# Patient Record
Sex: FEMALE | Race: WHITE | URBAN - METROPOLITAN AREA
[De-identification: names, ages, dates, MRNs, and addresses within clinical notes are randomized per-mention and may not be internally consistent; named-entity substitution may affect disease eponyms.]

---

## 2020-01-25 ENCOUNTER — APPOINTMENT (OUTPATIENT)
Dept: LAB | Age: 30
End: 2020-01-25
Attending: NURSE PRACTITIONER
Payer: COMMERCIAL

## 2020-01-25 ENCOUNTER — OFFICE VISIT (OUTPATIENT)
Dept: FAMILY MEDICINE CLINIC | Facility: CLINIC | Age: 30
End: 2020-01-25
Payer: COMMERCIAL

## 2020-01-25 VITALS
HEART RATE: 88 BPM | TEMPERATURE: 98 F | BODY MASS INDEX: 22.33 KG/M2 | OXYGEN SATURATION: 98 % | HEIGHT: 65 IN | SYSTOLIC BLOOD PRESSURE: 104 MMHG | DIASTOLIC BLOOD PRESSURE: 54 MMHG | RESPIRATION RATE: 12 BRPM | WEIGHT: 134 LBS

## 2020-01-25 DIAGNOSIS — Z00.00 WELLNESS EXAMINATION: ICD-10-CM

## 2020-01-25 DIAGNOSIS — Z13.0 SCREENING FOR DEFICIENCY ANEMIA: ICD-10-CM

## 2020-01-25 DIAGNOSIS — Z13.29 THYROID DISORDER SCREEN: ICD-10-CM

## 2020-01-25 DIAGNOSIS — Z13.220 LIPID SCREENING: ICD-10-CM

## 2020-01-25 DIAGNOSIS — Z00.00 ROUTINE GENERAL MEDICAL EXAMINATION AT A HEALTH CARE FACILITY: Primary | ICD-10-CM

## 2020-01-25 DIAGNOSIS — D56.3 THALASSEMIA MINOR: ICD-10-CM

## 2020-01-25 LAB
ALBUMIN SERPL-MCNC: 4.4 G/DL (ref 3.4–5)
ALBUMIN/GLOB SERPL: 1.5 {RATIO} (ref 1–2)
ALP LIVER SERPL-CCNC: 43 U/L (ref 37–98)
ALT SERPL-CCNC: 16 U/L (ref 13–56)
ANION GAP SERPL CALC-SCNC: 7 MMOL/L (ref 0–18)
AST SERPL-CCNC: 38 U/L (ref 15–37)
BASOPHILS # BLD AUTO: 0.06 X10(3) UL (ref 0–0.2)
BASOPHILS NFR BLD AUTO: 1.2 %
BILIRUB SERPL-MCNC: 4 MG/DL (ref 0.1–2)
BUN BLD-MCNC: 6 MG/DL (ref 7–18)
BUN/CREAT SERPL: 11.1 (ref 10–20)
CALCIUM BLD-MCNC: 8.4 MG/DL (ref 8.5–10.1)
CHLORIDE SERPL-SCNC: 106 MMOL/L (ref 98–112)
CHOLEST SMN-MCNC: 106 MG/DL (ref ?–200)
CO2 SERPL-SCNC: 27 MMOL/L (ref 21–32)
CREAT BLD-MCNC: 0.54 MG/DL (ref 0.55–1.02)
DEPRECATED RDW RBC AUTO: 62.8 FL (ref 35.1–46.3)
EOSINOPHIL # BLD AUTO: 0.08 X10(3) UL (ref 0–0.7)
EOSINOPHIL NFR BLD AUTO: 1.6 %
ERYTHROCYTE [DISTWIDTH] IN BLOOD BY AUTOMATED COUNT: 25.7 % (ref 11–15)
GLOBULIN PLAS-MCNC: 3 G/DL (ref 2.8–4.4)
GLUCOSE BLD-MCNC: 85 MG/DL (ref 70–99)
HCT VFR BLD AUTO: 27.4 % (ref 35–48)
HDLC SERPL-MCNC: 47 MG/DL (ref 40–59)
HGB BLD-MCNC: 8.5 G/DL (ref 12–16)
IMM GRANULOCYTES # BLD AUTO: 0.02 X10(3) UL (ref 0–1)
IMM GRANULOCYTES NFR BLD: 0.4 %
LDLC SERPL CALC-MCNC: 44 MG/DL (ref ?–100)
LYMPHOCYTES # BLD AUTO: 1.32 X10(3) UL (ref 1–4)
LYMPHOCYTES NFR BLD AUTO: 26.1 %
M PROTEIN MFR SERPL ELPH: 7.4 G/DL (ref 6.4–8.2)
MCH RBC QN AUTO: 22 PG (ref 26–34)
MCHC RBC AUTO-ENTMCNC: 31 G/DL (ref 31–37)
MCV RBC AUTO: 70.8 FL (ref 80–100)
MONOCYTES # BLD AUTO: 0.37 X10(3) UL (ref 0.1–1)
MONOCYTES NFR BLD AUTO: 7.3 %
NEUTROPHILS # BLD AUTO: 3.2 X10 (3) UL (ref 1.5–7.7)
NEUTROPHILS # BLD AUTO: 3.2 X10(3) UL (ref 1.5–7.7)
NEUTROPHILS NFR BLD AUTO: 63.4 %
NONHDLC SERPL-MCNC: 59 MG/DL (ref ?–130)
OSMOLALITY SERPL CALC.SUM OF ELEC: 287 MOSM/KG (ref 275–295)
PATIENT FASTING Y/N/NP: YES
PATIENT FASTING Y/N/NP: YES
PLATELET # BLD AUTO: 275 10(3)UL (ref 150–450)
POTASSIUM SERPL-SCNC: 4.4 MMOL/L (ref 3.5–5.1)
RBC # BLD AUTO: 3.87 X10(6)UL (ref 3.8–5.3)
SODIUM SERPL-SCNC: 140 MMOL/L (ref 136–145)
TRIGL SERPL-MCNC: 74 MG/DL (ref 30–149)
TSI SER-ACNC: 0.77 MIU/ML (ref 0.36–3.74)
VLDLC SERPL CALC-MCNC: 15 MG/DL (ref 0–30)
WBC # BLD AUTO: 5.1 X10(3) UL (ref 4–11)

## 2020-01-25 PROCEDURE — 36415 COLL VENOUS BLD VENIPUNCTURE: CPT | Performed by: NURSE PRACTITIONER

## 2020-01-25 PROCEDURE — 90471 IMMUNIZATION ADMIN: CPT | Performed by: NURSE PRACTITIONER

## 2020-01-25 PROCEDURE — 90686 IIV4 VACC NO PRSV 0.5 ML IM: CPT | Performed by: NURSE PRACTITIONER

## 2020-01-25 PROCEDURE — 80050 GENERAL HEALTH PANEL: CPT | Performed by: NURSE PRACTITIONER

## 2020-01-25 PROCEDURE — 99385 PREV VISIT NEW AGE 18-39: CPT | Performed by: NURSE PRACTITIONER

## 2020-01-25 PROCEDURE — 80061 LIPID PANEL: CPT | Performed by: NURSE PRACTITIONER

## 2020-01-25 PROCEDURE — 86787 VARICELLA-ZOSTER ANTIBODY: CPT | Performed by: NURSE PRACTITIONER

## 2020-01-25 NOTE — PATIENT INSTRUCTIONS
Check into Hep B vaccination - recommend to start soon. Titer for chicken pox today. Other labs pending. Form completed for her phlebotomy class. Request copy of last pap to be sent to our office. Recheck annually and as needed.

## 2020-01-25 NOTE — PROGRESS NOTES
HPI:     Pia Russell is a 26year old female who presents for an Annual Health Visit. Present to establish care  Robert Larson for this. Has had 8 transfusions with each pregnancy.    Had a pap about 2 years ago - normal. Sees gy cooperative  Head: Normocephalic, without obvious abnormality, atraumatic  Eyes: negative findings: wears glasses, vision 20/20 to each eye, 20/15 both eyes  Ears: normal TM's and external ear canals both ears  Nose: Nares normal. Septum midline.  Mucosa no Request copy of last pap to be sent to our office. Recheck annually and as needed. The patient indicates understanding of these issues and agrees to the plan.     Problem List:  Patient Active Problem List:     Thalassemia minor     Routine

## 2020-01-27 LAB — VZV IGG SER IA-ACNC: 730.7 (ref 165–?)

## 2020-02-10 ENCOUNTER — TELEPHONE (OUTPATIENT)
Dept: FAMILY MEDICINE CLINIC | Facility: CLINIC | Age: 30
End: 2020-02-10

## 2020-02-10 DIAGNOSIS — Z01.84 IMMUNITY STATUS TESTING: Primary | ICD-10-CM

## 2020-02-10 NOTE — TELEPHONE ENCOUNTER
Left detailed message on pt's voicemail that order has been entered and she just needs to call back and schedule a lab appt.

## 2020-02-10 NOTE — TELEPHONE ENCOUNTER
Pt seen for physical on 1/25/20 and states that she cannot find the documentation for Hep B. She is requesting an order for the titers. Please advise.

## 2020-02-10 NOTE — TELEPHONE ENCOUNTER
saw jennifer in january and was going to wait to see if she could find copy of her imms -  she is not able to find them and wants to know if she can have an order for titers done

## 2020-02-12 ENCOUNTER — APPOINTMENT (OUTPATIENT)
Dept: LAB | Age: 30
End: 2020-02-12
Attending: FAMILY MEDICINE
Payer: COMMERCIAL

## 2020-02-12 DIAGNOSIS — Z01.84 IMMUNITY STATUS TESTING: ICD-10-CM

## 2020-02-12 LAB
HBV SURFACE AB SER QL: REACTIVE
HBV SURFACE AB SERPL IA-ACNC: >1000 MIU/ML

## 2020-02-12 PROCEDURE — 86706 HEP B SURFACE ANTIBODY: CPT | Performed by: NURSE PRACTITIONER

## 2020-02-12 PROCEDURE — 36415 COLL VENOUS BLD VENIPUNCTURE: CPT | Performed by: NURSE PRACTITIONER

## 2020-02-13 ENCOUNTER — TELEPHONE (OUTPATIENT)
Dept: FAMILY MEDICINE CLINIC | Facility: CLINIC | Age: 30
End: 2020-02-13

## 2020-09-10 ENCOUNTER — TELEPHONE (OUTPATIENT)
Dept: FAMILY MEDICINE CLINIC | Facility: CLINIC | Age: 30
End: 2020-09-10

## 2020-09-10 NOTE — TELEPHONE ENCOUNTER
LOV  1/25/20 for physical. Patient is asking for an order for 2 step tb test
OK for 2 step TB (orders entered) and flu vaccine when we have it.    OK to mail the results
Patient has only seen Mary Ann Love. Will route to her pool.
Spoke with patient.  Scheduled for nurse visit and explained the process
needs an order for a 2 step tb test also needs hep B titer mailed to her.  also wants to get a flu inj
Normal rate, regular rhythm.  Heart sounds S1, S2.  No murmurs, rubs or gallops.

## 2020-09-16 ENCOUNTER — NURSE ONLY (OUTPATIENT)
Dept: FAMILY MEDICINE CLINIC | Facility: CLINIC | Age: 30
End: 2020-09-16
Payer: COMMERCIAL

## 2020-09-16 ENCOUNTER — IMMUNIZATION (OUTPATIENT)
Dept: FAMILY MEDICINE CLINIC | Facility: CLINIC | Age: 30
End: 2020-09-16
Payer: COMMERCIAL

## 2020-09-16 VITALS — TEMPERATURE: 99 F

## 2020-09-16 DIAGNOSIS — Z23 NEED FOR VACCINATION: ICD-10-CM

## 2020-09-16 DIAGNOSIS — Z00.00 ROUTINE GENERAL MEDICAL EXAMINATION AT A HEALTH CARE FACILITY: ICD-10-CM

## 2020-09-16 PROCEDURE — 90686 IIV4 VACC NO PRSV 0.5 ML IM: CPT | Performed by: NURSE PRACTITIONER

## 2020-09-16 PROCEDURE — 90471 IMMUNIZATION ADMIN: CPT | Performed by: NURSE PRACTITIONER

## 2020-09-16 PROCEDURE — 86580 TB INTRADERMAL TEST: CPT | Performed by: NURSE PRACTITIONER

## 2020-09-16 NOTE — PROGRESS NOTES
See phone note dated 9/10/20. PPD #1 given right forearm-  Pt tolerated well. Pt has also received flu vaccine- left deltoid. - tolerated well. Pt will call back to schedule PPD reading on Saturday morning.

## 2020-09-19 ENCOUNTER — NURSE ONLY (OUTPATIENT)
Dept: FAMILY MEDICINE CLINIC | Facility: CLINIC | Age: 30
End: 2020-09-19
Payer: COMMERCIAL

## 2020-09-23 ENCOUNTER — NURSE ONLY (OUTPATIENT)
Dept: FAMILY MEDICINE CLINIC | Facility: CLINIC | Age: 30
End: 2020-09-23
Payer: COMMERCIAL

## 2020-09-23 PROCEDURE — 86580 TB INTRADERMAL TEST: CPT | Performed by: NURSE PRACTITIONER

## 2020-09-26 ENCOUNTER — NURSE ONLY (OUTPATIENT)
Dept: FAMILY MEDICINE CLINIC | Facility: CLINIC | Age: 30
End: 2020-09-26
Payer: COMMERCIAL

## 2020-12-04 ENCOUNTER — TELEPHONE (OUTPATIENT)
Dept: FAMILY MEDICINE CLINIC | Facility: CLINIC | Age: 30
End: 2020-12-04

## 2020-12-04 NOTE — TELEPHONE ENCOUNTER
Pt called back. Pt c/o anxiety and feeling nervous. Pt reports s/s for a couple years now. Pt states she has social anxiety and states she is OCD that also stresses her out. Pt states she sometimes feels so anxious that her chest \"gets tight\".   Pt de

## 2020-12-08 NOTE — PATIENT INSTRUCTIONS
Consider counseling with CBT (cognitive behavior therapy). Juan Luis Salinas available at our office. Start Lexapro daily    Take Xanax as needed for anxiety    Recheck in 3 weeks - sooner if needed.        Understanding Social Phobia (Social Anxiety Disorde mouth  Getting help  Asking for help may be very hard for you, but the effort will be worth it. Certain medicines may lessen your symptoms. And behavioral therapy may help you conquer your fears.  This involves working with your therapist to learn how to re pharmacist with each prescription and refill. Be sure to read this information carefully each time. Talk to your pediatrician regarding the use of this medicine in children. Special care may be needed.   What side effects may I notice from receiving this m medicines for depression, anxiety, or psychotic disturbances  · certain medicines for migraine headache like almotriptan, eletriptan, frovatriptan, naratriptan, rizatriptan, sumatriptan, zolmitriptan  · certain medicines for sleep  · certain medicines that your treatment as prescribed by your doctor. Patients and their families should watch out for new or worsening thoughts of suicide or depression.  Also watch out for sudden changes in feelings such as feeling anxious, agitated, panicky, irritable, hostile,

## 2020-12-08 NOTE — PROGRESS NOTES
HPI:    Patient ID: Jozef Cameron is a 31year old female. HPI     States that her anxiety is getting really bad. More social and trouble wanting to be around people. Notes that her heart will race.  States that she is a little OCD and always cleani Pulmonary/Chest: Effort normal and breath sounds normal. No respiratory distress. Musculoskeletal: Normal range of motion. Lymphadenopathy:     She has no cervical adenopathy. Neurological: She is alert and oriented to person, place, and time.    Skin Social phobia is also called social anxiety disorder. It is an intense fear of being humiliated in a social or work setting. Even if you realize that your worries are irrational, you still expect people to  and think poorly of you.  You may stay away f Asking for help may be very hard for you, but the effort will be worth it. Certain medicines may lessen your symptoms. And behavioral therapy may help you conquer your fears.  This involves working with your therapist to learn how to relax when you feel anx Israel Cintron will be given to you by the pharmacist with each prescription and refill. Be sure to read this information carefully each time. Talk to your pediatrician regarding the use of this medicine in children. Special care may be needed.   What · aspirin and aspirin-like medicines  · carbamazepine  · certain medicines for depression, anxiety, or psychotic disturbances  · certain medicines for migraine headache like almotriptan, eletriptan, frovatriptan, naratriptan, rizatriptan, sumatriptan, zolm Tell your doctor if your symptoms do not get better or if they get worse. Visit your doctor or health care professional for regular checks on your progress.  Because it may take several weeks to see the full effects of this medicine, it is important to cont

## 2020-12-17 ENCOUNTER — TELEPHONE (OUTPATIENT)
Dept: FAMILY MEDICINE CLINIC | Facility: CLINIC | Age: 30
End: 2020-12-17

## 2020-12-17 NOTE — TELEPHONE ENCOUNTER
This doesn't sound like a typical reaction. Recommend to try taking again to today. If symptoms recur, stop the medication and let us know.

## 2020-12-17 NOTE — TELEPHONE ENCOUNTER
Patient started taking escitalopram 12/8/20  Daily. She has not had any reaction until yesterday 12/16/20. Unsure if it was this medication that caused the symptoms. Itchy scalp, arms and legs with cold sweat last night. Resolved this morning.  No rash or S

## 2020-12-17 NOTE — TELEPHONE ENCOUNTER
Pt thinks she is having a reaction to the Lexapro that was prescribed to her. She has itchy scalp and arms with chills at night. Pt has not taken the med today, please c/b.

## 2020-12-18 ENCOUNTER — TELEPHONE (OUTPATIENT)
Dept: FAMILY MEDICINE CLINIC | Facility: CLINIC | Age: 30
End: 2020-12-18

## 2020-12-18 NOTE — TELEPHONE ENCOUNTER
Pt was seen in office per CS - 12/8/20  Pt was given order to start Lexapro. See notes from yesterday, pt   States she took another dose of Lexapro yesterday- but states she has scalp and facial itching and hives on arms, legs and face.     Pt states her

## 2020-12-18 NOTE — TELEPHONE ENCOUNTER
Pt established care with Sherin back on 1/25/20. Pt seen again 12/8/20. Note routed to [facility] as pt was not seen by CR. Pt agreed to await call back on Monday after Sherin's review.

## 2020-12-18 NOTE — TELEPHONE ENCOUNTER
----- Message from Kim Joey sent at 8/30/2017  2:31 PM CDT -----  Contact: pt's sister ilana [phone]  pt's sister ilana [phone] requests the nurse to call her to reschedule pt's appointment in late October.   Spoke with pt's daughter at 255pm on 08/30/17, appointment was rescheduled per request to 10/13/17, labs due 0800am and clinic visit at 09am, daughter verbalized understanding.  Niya   Re: update from Medication -   Still having same issues after taking it yeserday -  Any recommendations - Please advise

## 2020-12-21 NOTE — TELEPHONE ENCOUNTER
Agree to stop Lexapro. Please see if patient wants to try Effexor or another medication for her anxiety. Thank you.

## 2020-12-22 ENCOUNTER — LAB ENCOUNTER (OUTPATIENT)
Dept: LAB | Age: 30
End: 2020-12-22
Attending: NURSE PRACTITIONER
Payer: COMMERCIAL

## 2020-12-22 PROBLEM — D50.9 IRON DEFICIENCY ANEMIA: Status: ACTIVE | Noted: 2017-05-24

## 2020-12-22 PROCEDURE — 83550 IRON BINDING TEST: CPT | Performed by: NURSE PRACTITIONER

## 2020-12-22 PROCEDURE — 82607 VITAMIN B-12: CPT | Performed by: NURSE PRACTITIONER

## 2020-12-22 PROCEDURE — 80050 GENERAL HEALTH PANEL: CPT | Performed by: NURSE PRACTITIONER

## 2020-12-22 PROCEDURE — 83540 ASSAY OF IRON: CPT | Performed by: NURSE PRACTITIONER

## 2020-12-22 PROCEDURE — 36415 COLL VENOUS BLD VENIPUNCTURE: CPT | Performed by: NURSE PRACTITIONER

## 2020-12-22 PROCEDURE — 82306 VITAMIN D 25 HYDROXY: CPT | Performed by: NURSE PRACTITIONER

## 2020-12-22 PROCEDURE — 82728 ASSAY OF FERRITIN: CPT | Performed by: NURSE PRACTITIONER

## 2020-12-22 NOTE — PROGRESS NOTES
HPI:    Patient ID: Giuliana Boyd is a 31year old female. HPI     Patient is present with concern about having hives when she is with taking the Lexapro. States it started in her second week of taking the medicine.   It has not reoccurred since she Neck: Normal range of motion. Neck supple. Thyromegaly (Thyroid was slightly enlarged) present. Cardiovascular: Normal rate, regular rhythm, normal heart sounds and intact distal pulses.    Pulmonary/Chest: Effort normal and breath sounds normal. No res

## 2020-12-22 NOTE — PATIENT INSTRUCTIONS
No more Lexapro. Start Pristiq daily. Recheck in about a month.      Labs pending with a copy to go to Dr. Elle Yang

## 2020-12-23 ENCOUNTER — TELEPHONE (OUTPATIENT)
Dept: FAMILY MEDICINE CLINIC | Facility: CLINIC | Age: 30
End: 2020-12-23

## 2020-12-23 DIAGNOSIS — E04.9 ENLARGED THYROID: Primary | ICD-10-CM

## 2020-12-23 NOTE — TELEPHONE ENCOUNTER
----- Message from NetScalerBREANA sent at 12/23/2020 11:36 AM CST -----  TSH is normal. Note to Christophert.

## 2020-12-23 NOTE — TELEPHONE ENCOUNTER
Patient asked if you wanted labs to check her thyroid. She thought it was part of the labs being drawn.

## 2020-12-23 NOTE — TELEPHONE ENCOUNTER
----- Message from Sherren Portela, APRN sent at 12/23/2020  8:17 AM CST -----  Please fax labs to Dr. Jovita Vaughn. Patient to contact him for an appointment. Vitamin levels and iron are low to low normal. Recommend a multivitamin with iron daily.

## 2020-12-23 NOTE — TELEPHONE ENCOUNTER
Re:  talk to Dr. Joey Chisholm -  medication might be affecting her blood -   wants to discuss her Anexity med - going to stop the medication - any other medication that can help with her anexity

## 2020-12-23 NOTE — TELEPHONE ENCOUNTER
Patient states Dr. Hannah Cavazos informed her the Lexapro might have hemolyzed her blood. She took a dose of Pristiq and had the same reaction when she took the lexapro. Itchy scalp. Did not develop hives. Did not take another dose of Pristiq.

## 2020-12-23 NOTE — TELEPHONE ENCOUNTER
Hold the Pristiq. Recommend seeing a counselor for CBT (cognitive behavior therapy). Also recommend follow up with  Dr. Kristopher Riddle.

## 2021-01-08 ENCOUNTER — PATIENT MESSAGE (OUTPATIENT)
Dept: FAMILY MEDICINE CLINIC | Facility: CLINIC | Age: 31
End: 2021-01-08

## 2021-01-08 NOTE — TELEPHONE ENCOUNTER
From: Abiola Jaquez  To: BREANA Dejesus  Sent: 1/8/2021 9:33 AM CST  Subject: Prescription Question    Hi there.  I was just wanting to update you on my visit with Dr. Dat Norris and that he said I could try taking the pristiq since it is not exactl

## 2021-01-30 ENCOUNTER — PATIENT MESSAGE (OUTPATIENT)
Dept: FAMILY MEDICINE CLINIC | Facility: CLINIC | Age: 31
End: 2021-01-30

## 2021-01-30 RX ORDER — ALPRAZOLAM 0.25 MG/1
0.25 TABLET ORAL 3 TIMES DAILY PRN
Qty: 20 TABLET | Refills: 0 | Status: SHIPPED | OUTPATIENT
Start: 2021-01-30 | End: 2021-06-02

## 2021-01-30 RX ORDER — DESVENLAFAXINE 50 MG/1
50 TABLET, EXTENDED RELEASE ORAL DAILY
Qty: 30 TABLET | Refills: 1 | Status: SHIPPED | OUTPATIENT
Start: 2021-01-30 | End: 2021-03-09

## 2021-01-30 NOTE — TELEPHONE ENCOUNTER
From: Juani Van  To: BREANA Macedo  Sent: 1/30/2021 9:09 AM CST  Subject: Prescription Question    Hi there I was just wanting to update you about the pristiq medicine, I have not had any reaction to it and my levels were about normal. It do

## 2021-01-30 NOTE — TELEPHONE ENCOUNTER
From: Marcia Gonsalez  To: BREANA Awad  Sent: 1/30/2021 10:05 AM CST  Subject: Other    [id number] Debby Wei that works, I may consider upping the pristiq. I will let you know if I may need to. Thank you.

## 2021-03-08 RX ORDER — DESVENLAFAXINE 50 MG/1
50 TABLET, EXTENDED RELEASE ORAL DAILY
Qty: 30 TABLET | Refills: 1 | Status: CANCELLED | OUTPATIENT
Start: 2021-03-08

## 2021-03-08 NOTE — TELEPHONE ENCOUNTER
Future appt: Due for follow up .  Sent Natanael Ulient message    Last Appointment with provider:   12/22/2020 Medication/anxiety follow up  Last appointment at [facility]:  12/22/2020  Cholesterol, Total (mg/dL)   Date Value   01/25/2020 106     HDL Cholesterol

## 2021-03-09 ENCOUNTER — TELEPHONE (OUTPATIENT)
Dept: FAMILY MEDICINE CLINIC | Facility: CLINIC | Age: 31
End: 2021-03-09

## 2021-03-09 PROBLEM — F40.10 SOCIAL ANXIETY DISORDER: Status: ACTIVE | Noted: 2021-03-09

## 2021-03-09 NOTE — TELEPHONE ENCOUNTER
Akash Roper  verbally consents to a Virtual/Telephone Check-In service on 3/9/2021. Patient understands and accepts financial responsibility for any deductible, co-insurance and/or co-pays associated with this service.   Future Appointments   Date T

## 2021-03-09 NOTE — PROGRESS NOTES
HPI:    Patient ID: Rufino Gallegos is a 31year old female. HPI     Video visit done with patient to follow-up on her Pristiq. States that she was doing fairly well on the medicine until she ran out on Saturday.   Since then she has been noting seeing distress. Neurological:      Mental Status: She is alert. Psychiatric:         Attention and Perception: Attention and perception normal.         Mood and Affect: Mood normal.         Behavior: Behavior normal.         Thought Content:  Thought content

## 2021-04-05 NOTE — TELEPHONE ENCOUNTER
From: Koko Mitchell  To: BREANA Clay  Sent: 4/5/2021 1:35 PM CDT  Subject: Prescription Question    Hi there, I am down to 2 of the pristiq. Should I schedule an appointment for a refill?  The zoloft has been helping a little with my OCD and I

## 2021-04-07 ENCOUNTER — TELEPHONE (OUTPATIENT)
Dept: FAMILY MEDICINE CLINIC | Facility: CLINIC | Age: 31
End: 2021-04-07

## 2021-04-07 NOTE — TELEPHONE ENCOUNTER
Linette Velez  verbally consents to a Virtual/Telephone Check-In service on 4/9/2021. Patient understands and accepts financial responsibility for any deductible, co-insurance and/or co-pays associated with this service.         consent given for VV

## 2021-04-09 ENCOUNTER — TELEMEDICINE (OUTPATIENT)
Dept: FAMILY MEDICINE CLINIC | Facility: CLINIC | Age: 31
End: 2021-04-09
Payer: COMMERCIAL

## 2021-04-09 DIAGNOSIS — F42.9 OBSESSIVE-COMPULSIVE DISORDER, UNSPECIFIED TYPE: ICD-10-CM

## 2021-04-09 PROCEDURE — 99213 OFFICE O/P EST LOW 20 MIN: CPT | Performed by: NURSE PRACTITIONER

## 2021-04-09 RX ORDER — DESVENLAFAXINE 50 MG/1
50 TABLET, EXTENDED RELEASE ORAL DAILY
Qty: 30 TABLET | Refills: 2 | Status: SHIPPED | OUTPATIENT
Start: 2021-04-09 | End: 2021-05-05

## 2021-04-12 NOTE — PATIENT INSTRUCTIONS
Change from Zoloft to [address]. Call with update in a few weeks. Otherwise follow up as needed.

## 2021-04-12 NOTE — PROGRESS NOTES
HPI:    Patient ID: Linette Velez is a 31year old female. HPI     Video visit was done with patient to follow-up on her Zoloft. Patient feels like it is helping some but is not completely to where she wants it.   Feels like she still is having some Behavior: Behavior normal.         Thought Content:  Thought content normal.         Judgment: Judgment normal.                ASSESSMENT/PLAN:   Obsessive-compulsive disorder, unspecified type    No orders of the defined types were placed in this encounte

## 2021-04-16 NOTE — TELEPHONE ENCOUNTER
Future appt:    Last Appointment with provider:   telemed 4/9/21(OCD)  Last appointment at [facility]:  12/22/2020    Sertraline HCl 25 MG Oral Tab    30tab  1refill        Filled:3/9/21 Summary: Take 1 tablet (25 mg total) by mouth daily            Chol

## 2021-05-05 DIAGNOSIS — F42.9 OBSESSIVE-COMPULSIVE DISORDER, UNSPECIFIED TYPE: ICD-10-CM

## 2021-05-06 RX ORDER — DESVENLAFAXINE 50 MG/1
50 TABLET, EXTENDED RELEASE ORAL DAILY
Qty: 30 TABLET | Refills: 0 | Status: SHIPPED | OUTPATIENT
Start: 2021-05-06 | End: 2021-06-08

## 2021-05-06 NOTE — TELEPHONE ENCOUNTER
Future appt:    Last Appointment with provider:   4/9/21telemed(OCD)-F/U in couple of weeks  Last appointment at [facility]:  12/22/2020    Desvenlafaxine Succinate ER (PRISTIQ) 50 MG Oral Tablet 24 Hr    30tab  2refill      Filled:4/9/21 Summary: Take 1

## 2021-05-20 DIAGNOSIS — F42.9 OBSESSIVE-COMPULSIVE DISORDER, UNSPECIFIED TYPE: ICD-10-CM

## 2021-05-21 RX ORDER — SERTRALINE HYDROCHLORIDE 25 MG/1
25 TABLET, FILM COATED ORAL DAILY
Qty: 30 TABLET | Refills: 1 | Status: SHIPPED | OUTPATIENT
Start: 2021-05-21 | End: 2021-06-08

## 2021-05-21 NOTE — TELEPHONE ENCOUNTER
Future appt:    Last Appointment with provider:   4/9/21 for OCD  Last appointment at [facility] [place]:  12/22/2020  Cholesterol, Total (mg/dL)   Date Value   01/25/2020 106     HDL Cholesterol (mg/dL)   Date Value   01/25/2020 47     LDL Cholesterol (mg/dL)

## 2021-06-02 NOTE — TELEPHONE ENCOUNTER
Alprazolam: 1/30/21    Call with update in a few weeks.      Otherwise follow up as needed.             Future appt:    Last Appointment with provider:     Telemedicine:  4/9/21    Last appointment at [facility]:  12/22/2020  Cholesterol, Total (mg/dL)

## 2021-06-03 RX ORDER — ALPRAZOLAM 0.25 MG/1
0.25 TABLET ORAL 3 TIMES DAILY PRN
Qty: 20 TABLET | Refills: 0 | Status: SHIPPED | OUTPATIENT
Start: 2021-06-03

## 2021-06-03 NOTE — TELEPHONE ENCOUNTER
IL  reviewed. One refill done. Please let patient know and that she is due for a follow up before more refills. Thank you.

## 2021-06-04 DIAGNOSIS — F42.9 OBSESSIVE-COMPULSIVE DISORDER, UNSPECIFIED TYPE: ICD-10-CM

## 2021-06-04 RX ORDER — DESVENLAFAXINE 50 MG/1
50 TABLET, EXTENDED RELEASE ORAL DAILY
Qty: 30 TABLET | Refills: 0 | Status: CANCELLED | OUTPATIENT
Start: 2021-06-04

## 2021-06-04 NOTE — TELEPHONE ENCOUNTER
Future appt:    Last Appointment with provider:  4/9/21 for medication follow up.   Last appointment [place] [facility] [place]:  12/22/2020  Cholesterol, Total (mg/dL)   Date Value   01/25/2020 106     HDL Cholesterol (mg/dL)   Date Value   01/25/2020 47     LDL Chol

## 2021-06-08 NOTE — PROGRESS NOTES
HPI:    Patient ID: Matilde Brink is a 31year old female. HPI      Video visit done with patient for follow up on her medications.  States that in general she is better, but still struggles toward the end of the day with increased stress and feeling Psychiatric:         Attention and Perception: Attention and perception normal.         Mood and Affect: Mood and affect normal.         Speech: Speech normal.         Behavior: Behavior normal. Behavior is cooperative. Thought Content:  Thought c

## 2021-06-08 NOTE — PATIENT INSTRUCTIONS
Continue with the Pristiq. Increase the sertraline to 50 mg daily. Recheck in 6 months with possible physical.  Contact office if any concerns in the meantime    Schedule a GYN appointment in the meantime.

## 2021-06-08 NOTE — TELEPHONE ENCOUNTER
Pt had telemed visit today 6/8/21 and jennifer refilled this med under another encounter.  This is now a dup med and is being deleted

## 2021-07-07 DIAGNOSIS — F42.9 OBSESSIVE-COMPULSIVE DISORDER, UNSPECIFIED TYPE: ICD-10-CM

## 2021-07-07 RX ORDER — DESVENLAFAXINE 50 MG/1
50 TABLET, EXTENDED RELEASE ORAL DAILY
Qty: 30 TABLET | Refills: 5 | OUTPATIENT
Start: 2021-07-07

## 2021-08-08 DIAGNOSIS — F42.9 OBSESSIVE-COMPULSIVE DISORDER, UNSPECIFIED TYPE: ICD-10-CM

## 2021-08-09 DIAGNOSIS — F42.9 OBSESSIVE-COMPULSIVE DISORDER, UNSPECIFIED TYPE: ICD-10-CM

## 2021-08-09 RX ORDER — DESVENLAFAXINE 50 MG/1
50 TABLET, EXTENDED RELEASE ORAL DAILY
Qty: 30 TABLET | Refills: 5 | OUTPATIENT
Start: 2021-08-09

## 2021-08-09 NOTE — TELEPHONE ENCOUNTER
Future appt:    Last Appointment with provider 6/8/21- recheck in 6 months.   Last appointment at [facility] [place] [place]:  Visit date not found  Cholesterol, Total (mg/dL)   Date Value   01/25/2020 106     HDL Cholesterol (mg/dL)   Date Value   01/25/2020 47     LDL

## 2021-08-10 RX ORDER — DESVENLAFAXINE 50 MG/1
50 TABLET, EXTENDED RELEASE ORAL DAILY
Qty: 30 TABLET | Refills: 5 | OUTPATIENT
Start: 2021-08-10

## 2021-10-08 ENCOUNTER — OFFICE VISIT (OUTPATIENT)
Dept: FAMILY MEDICINE CLINIC | Facility: CLINIC | Age: 31
End: 2021-10-08
Payer: COMMERCIAL

## 2021-10-08 VITALS
WEIGHT: 137.38 LBS | HEART RATE: 88 BPM | RESPIRATION RATE: 16 BRPM | SYSTOLIC BLOOD PRESSURE: 114 MMHG | OXYGEN SATURATION: 99 % | BODY MASS INDEX: 22.89 KG/M2 | DIASTOLIC BLOOD PRESSURE: 68 MMHG | HEIGHT: 65 IN | TEMPERATURE: 98 F

## 2021-10-08 DIAGNOSIS — B07.8 OTHER VIRAL WARTS: ICD-10-CM

## 2021-10-08 DIAGNOSIS — D56.3 THALASSEMIA MINOR: ICD-10-CM

## 2021-10-08 DIAGNOSIS — F42.9 OBSESSIVE-COMPULSIVE DISORDER, UNSPECIFIED TYPE: ICD-10-CM

## 2021-10-08 DIAGNOSIS — D50.9 IRON DEFICIENCY ANEMIA, UNSPECIFIED IRON DEFICIENCY ANEMIA TYPE: ICD-10-CM

## 2021-10-08 DIAGNOSIS — D22.9 CHANGE IN COLOR OF SKIN MOLE: Primary | ICD-10-CM

## 2021-10-08 PROCEDURE — 99214 OFFICE O/P EST MOD 30 MIN: CPT | Performed by: NURSE PRACTITIONER

## 2021-10-08 PROCEDURE — 85025 COMPLETE CBC W/AUTO DIFF WBC: CPT | Performed by: NURSE PRACTITIONER

## 2021-10-08 PROCEDURE — 83540 ASSAY OF IRON: CPT | Performed by: NURSE PRACTITIONER

## 2021-10-08 PROCEDURE — 82728 ASSAY OF FERRITIN: CPT | Performed by: NURSE PRACTITIONER

## 2021-10-08 PROCEDURE — 3074F SYST BP LT 130 MM HG: CPT | Performed by: NURSE PRACTITIONER

## 2021-10-08 PROCEDURE — 83550 IRON BINDING TEST: CPT | Performed by: NURSE PRACTITIONER

## 2021-10-08 PROCEDURE — 3008F BODY MASS INDEX DOCD: CPT | Performed by: NURSE PRACTITIONER

## 2021-10-08 PROCEDURE — 3078F DIAST BP <80 MM HG: CPT | Performed by: NURSE PRACTITIONER

## 2021-10-08 RX ORDER — DESVENLAFAXINE 50 MG/1
50 TABLET, EXTENDED RELEASE ORAL DAILY
Qty: 90 TABLET | Refills: 1 | Status: SHIPPED | OUTPATIENT
Start: 2021-10-08

## 2021-10-08 NOTE — PATIENT INSTRUCTIONS
Referral to derm. Keep thumb covered today. Repeat cryo in 3 weeks if not resolved. Labs pending. Follow up as needed.

## 2021-10-09 ENCOUNTER — TELEPHONE (OUTPATIENT)
Dept: FAMILY MEDICINE CLINIC | Facility: CLINIC | Age: 31
End: 2021-10-09

## 2021-10-09 NOTE — TELEPHONE ENCOUNTER
----- Message from BREANA Rios sent at 10/9/2021  9:18 AM CDT -----  Iron is improved. Still shows anemia. Recommend referral to hematology. Please see if she has seen one in the past. Thank you. Note to Annalisa.

## 2021-10-13 NOTE — TELEPHONE ENCOUNTER
Informed pt of results. Pt sees Dr. Chen Johnson and requested lab work be sent there. Pt will follow with them. No further questions.

## 2021-10-15 NOTE — PROGRESS NOTES
HPI:    Patient ID: Adrienne Candelaria is a 27year old female. HPI     Patient is present with 2 concerns. First she has a mole on her left upper arm that was inflamed a few days ago.    Second she has a wart on her thumb that she hasn't been able to reso Change in color of skin mole  (primary encounter diagnosis)  Other viral warts  Obsessive-compulsive disorder, unspecified type  Thalassemia minor  Iron deficiency anemia, unspecified iron deficiency anemia type    Orders Placed This Encounter      CBC W

## 2021-10-29 NOTE — PROGRESS NOTES
Bed: RT2  Expected date:   Expected time:   Means of arrival:   Comments:  triage   Pt arrived for second PPD. PPD #2 given to left forearm  Pt tolerated well.   Scheduled appt for reading Sat 9/26

## 2021-11-03 ENCOUNTER — TELEPHONE (OUTPATIENT)
Dept: FAMILY MEDICINE CLINIC | Facility: CLINIC | Age: 31
End: 2021-11-03

## 2021-11-03 ENCOUNTER — OFFICE VISIT (OUTPATIENT)
Dept: FAMILY MEDICINE CLINIC | Facility: CLINIC | Age: 31
End: 2021-11-03
Payer: COMMERCIAL

## 2021-11-03 VITALS
HEART RATE: 86 BPM | OXYGEN SATURATION: 99 % | SYSTOLIC BLOOD PRESSURE: 126 MMHG | TEMPERATURE: 98 F | DIASTOLIC BLOOD PRESSURE: 60 MMHG

## 2021-11-03 DIAGNOSIS — J06.9 UPPER RESPIRATORY TRACT INFECTION, UNSPECIFIED TYPE: Primary | ICD-10-CM

## 2021-11-03 PROCEDURE — 3078F DIAST BP <80 MM HG: CPT | Performed by: NURSE PRACTITIONER

## 2021-11-03 PROCEDURE — 3074F SYST BP LT 130 MM HG: CPT | Performed by: NURSE PRACTITIONER

## 2021-11-03 PROCEDURE — 99213 OFFICE O/P EST LOW 20 MIN: CPT | Performed by: NURSE PRACTITIONER

## 2021-11-03 NOTE — PROGRESS NOTES
HPI:    Patient ID: Pako Alfonso is a 27year old female. HPI     [facility]    Son positive last week. Started yesterday with aches and congestion. Chest tightness. No medications.      States that she needs the test to be able to go back to Covid test pending. Quarantine until get results.     Take acetaminophen or ibuprofen for fever/discomfort  Drink plenty of fluids, warm liquids  Decongestants for congestion  Expectorant and/or cough suppressant  Use saline drops as needed or use neti p

## 2021-11-03 NOTE — TELEPHONE ENCOUNTER
Re:  son tested postive last week of COVID. Needs a COVID test to go back to work. does have recent symptoms - headache & chst congestion .    would like to get tested @ [facility].  - Please advise

## 2021-11-03 NOTE — PATIENT INSTRUCTIONS
Covid test pending. Quarantine until get results.     Take acetaminophen or ibuprofen for fever/discomfort  Drink plenty of fluids, warm liquids  Decongestants for congestion  Expectorant and/or cough suppressant  Use saline drops as needed or use neti pot

## 2021-11-03 NOTE — TELEPHONE ENCOUNTER
Appt scheduled.      Future Appointments   Date Time Provider [facility]   11/3/2021  2:30 PM Miquel Villar APRN [facility]

## 2021-11-05 ENCOUNTER — TELEPHONE (OUTPATIENT)
Dept: FAMILY MEDICINE CLINIC | Facility: CLINIC | Age: 31
End: 2021-11-05

## 2021-11-05 NOTE — TELEPHONE ENCOUNTER
----- Message from BREANA Connor sent at 11/4/2021  6:45 PM CDT -----  Please let patient know that her Covid test is positive. She needs to quarantine for 10 days from the onset of the symptoms.  Recommend to increase fluid intake, take zinc 30 mg,

## 2021-12-30 NOTE — TELEPHONE ENCOUNTER
Future appt:    Last Appointment with provider:   11/3/2021 for URI. 10/8/21 for medication follow up.   Last appointment at [facility] [place]:  11/3/2021  Cholesterol, Total (mg/dL)   Date Value   01/25/2020 106     HDL Cholesterol (mg/dL)   Date Value   01/25/

## 2021-12-30 NOTE — TELEPHONE ENCOUNTER
One refill done.    Please let patient know that she is overdue for wellness exam and recheck on medications

## 2022-06-04 DIAGNOSIS — F42.9 OBSESSIVE-COMPULSIVE DISORDER, UNSPECIFIED TYPE: ICD-10-CM

## 2022-06-06 NOTE — TELEPHONE ENCOUNTER
Due for physical. [facility] 6/6  Future appt:    Last Appointment with provider: 10/8/21 Medication follow up    Last appointment at [facility] [place]:  Visit date not found  Cholesterol, Total (mg/dL)   Date Value   01/25/2020 106     HDL Cholesterol (mg/dL)   Date Value   01/25/2020 47     LDL Cholesterol (mg/dL)   Date Value   01/25/2020 44     Triglycerides (mg/dL)   Date Value   01/25/2020 74     No results found for: EAG, A1C  Lab Results   Component Value Date    TSH 0.810 12/22/2020       No follow-ups on file.

## 2022-06-09 RX ORDER — DESVENLAFAXINE 50 MG/1
TABLET, EXTENDED RELEASE ORAL
Qty: 90 TABLET | Refills: 0 | Status: SHIPPED | OUTPATIENT
Start: 2022-06-09

## 2022-06-09 NOTE — TELEPHONE ENCOUNTER
Future Appointments   Date Time Provider [place] Diaz   6/11/2022 11:00 AM Yi Villar APRN [facility] [place]

## 2022-06-11 ENCOUNTER — OFFICE VISIT (OUTPATIENT)
Dept: FAMILY MEDICINE CLINIC | Facility: CLINIC | Age: 32
End: 2022-06-11
Payer: COMMERCIAL

## 2022-06-11 ENCOUNTER — LAB ENCOUNTER (OUTPATIENT)
Dept: LAB | Age: 32
End: 2022-06-11
Attending: NURSE PRACTITIONER
Payer: COMMERCIAL

## 2022-06-11 VITALS
HEART RATE: 93 BPM | DIASTOLIC BLOOD PRESSURE: 68 MMHG | RESPIRATION RATE: 18 BRPM | WEIGHT: 136 LBS | BODY MASS INDEX: 22.39 KG/M2 | TEMPERATURE: 97 F | HEIGHT: 65.5 IN | SYSTOLIC BLOOD PRESSURE: 114 MMHG | OXYGEN SATURATION: 95 %

## 2022-06-11 DIAGNOSIS — D56.3 THALASSEMIA MINOR: ICD-10-CM

## 2022-06-11 DIAGNOSIS — D50.9 IRON DEFICIENCY ANEMIA, UNSPECIFIED IRON DEFICIENCY ANEMIA TYPE: ICD-10-CM

## 2022-06-11 DIAGNOSIS — Z12.4 SCREENING FOR CERVICAL CANCER: ICD-10-CM

## 2022-06-11 DIAGNOSIS — Z01.419 ENCOUNTER FOR WELL WOMAN EXAM: Primary | ICD-10-CM

## 2022-06-11 DIAGNOSIS — F40.10 SOCIAL ANXIETY DISORDER: ICD-10-CM

## 2022-06-11 DIAGNOSIS — Z00.00 WELLNESS EXAMINATION: ICD-10-CM

## 2022-06-11 DIAGNOSIS — Z00.00 ROUTINE GENERAL MEDICAL EXAMINATION AT A HEALTH CARE FACILITY: ICD-10-CM

## 2022-06-11 DIAGNOSIS — D56.3 HETEROZYGOUS THALASSEMIA: ICD-10-CM

## 2022-06-11 LAB
ALBUMIN SERPL-MCNC: 4.7 G/DL (ref 3.4–5)
ALBUMIN/GLOB SERPL: 1.7 {RATIO} (ref 1–2)
ALP LIVER SERPL-CCNC: 48 U/L
ALT SERPL-CCNC: 17 U/L
ANION GAP SERPL CALC-SCNC: 6 MMOL/L (ref 0–18)
AST SERPL-CCNC: 32 U/L (ref 15–37)
BASOPHILS # BLD AUTO: 0.07 X10(3) UL (ref 0–0.2)
BASOPHILS NFR BLD AUTO: 1.2 %
BILIRUB SERPL-MCNC: 4.2 MG/DL (ref 0.1–2)
BUN BLD-MCNC: 11 MG/DL (ref 7–18)
CALCIUM BLD-MCNC: 9.4 MG/DL (ref 8.5–10.1)
CHLORIDE SERPL-SCNC: 105 MMOL/L (ref 98–112)
CHOLEST SERPL-MCNC: 101 MG/DL (ref ?–200)
CO2 SERPL-SCNC: 28 MMOL/L (ref 21–32)
CREAT BLD-MCNC: 0.67 MG/DL
DEPRECATED HBV CORE AB SER IA-ACNC: 82.1 NG/ML
EOSINOPHIL # BLD AUTO: 0.13 X10(3) UL (ref 0–0.7)
EOSINOPHIL NFR BLD AUTO: 2.2 %
ERYTHROCYTE [DISTWIDTH] IN BLOOD BY AUTOMATED COUNT: 24.3 %
FASTING PATIENT LIPID ANSWER: NO
FASTING STATUS PATIENT QL REPORTED: NO
GLOBULIN PLAS-MCNC: 2.8 G/DL (ref 2.8–4.4)
GLUCOSE BLD-MCNC: 91 MG/DL (ref 70–99)
HCT VFR BLD AUTO: 28.1 %
HDLC SERPL-MCNC: 44 MG/DL (ref 40–59)
HGB BLD-MCNC: 8.3 G/DL
IMM GRANULOCYTES # BLD AUTO: 0.04 X10(3) UL (ref 0–1)
IMM GRANULOCYTES NFR BLD: 0.7 %
IRON SATN MFR SERPL: 31 %
IRON SERPL-MCNC: 90 UG/DL
LDLC SERPL CALC-MCNC: 40 MG/DL (ref ?–100)
LYMPHOCYTES # BLD AUTO: 1.55 X10(3) UL (ref 1–4)
LYMPHOCYTES NFR BLD AUTO: 26.5 %
MCH RBC QN AUTO: 23.1 PG (ref 26–34)
MCHC RBC AUTO-ENTMCNC: 29.5 G/DL (ref 31–37)
MCV RBC AUTO: 78.3 FL
MONOCYTES # BLD AUTO: 0.34 X10(3) UL (ref 0.1–1)
MONOCYTES NFR BLD AUTO: 5.8 %
NEUTROPHILS # BLD AUTO: 3.71 X10 (3) UL (ref 1.5–7.7)
NEUTROPHILS # BLD AUTO: 3.71 X10(3) UL (ref 1.5–7.7)
NEUTROPHILS NFR BLD AUTO: 63.6 %
NONHDLC SERPL-MCNC: 57 MG/DL (ref ?–130)
OSMOLALITY SERPL CALC.SUM OF ELEC: 287 MOSM/KG (ref 275–295)
PLATELET # BLD AUTO: 257 10(3)UL (ref 150–450)
PLATELET MORPHOLOGY: NORMAL
POTASSIUM SERPL-SCNC: 3.7 MMOL/L (ref 3.5–5.1)
PROT SERPL-MCNC: 7.5 G/DL (ref 6.4–8.2)
RBC # BLD AUTO: 3.59 X10(6)UL
SODIUM SERPL-SCNC: 139 MMOL/L (ref 136–145)
TIBC SERPL-MCNC: 288 UG/DL (ref 240–450)
TRANSFERRIN SERPL-MCNC: 193 MG/DL (ref 200–360)
TRIGL SERPL-MCNC: 88 MG/DL (ref 30–149)
TSI SER-ACNC: 0.76 MIU/ML (ref 0.36–3.74)
VIT B12 SERPL-MCNC: 675 PG/ML (ref 193–986)
VIT D+METAB SERPL-MCNC: 27 NG/ML (ref 30–100)
VLDLC SERPL CALC-MCNC: 12 MG/DL (ref 0–30)
WBC # BLD AUTO: 5.8 X10(3) UL (ref 4–11)

## 2022-06-11 PROCEDURE — 3074F SYST BP LT 130 MM HG: CPT | Performed by: NURSE PRACTITIONER

## 2022-06-11 PROCEDURE — 82728 ASSAY OF FERRITIN: CPT | Performed by: NURSE PRACTITIONER

## 2022-06-11 PROCEDURE — 83550 IRON BINDING TEST: CPT | Performed by: NURSE PRACTITIONER

## 2022-06-11 PROCEDURE — 82607 VITAMIN B-12: CPT | Performed by: NURSE PRACTITIONER

## 2022-06-11 PROCEDURE — 99395 PREV VISIT EST AGE 18-39: CPT | Performed by: NURSE PRACTITIONER

## 2022-06-11 PROCEDURE — 3008F BODY MASS INDEX DOCD: CPT | Performed by: NURSE PRACTITIONER

## 2022-06-11 PROCEDURE — 80050 GENERAL HEALTH PANEL: CPT | Performed by: NURSE PRACTITIONER

## 2022-06-11 PROCEDURE — 82306 VITAMIN D 25 HYDROXY: CPT | Performed by: NURSE PRACTITIONER

## 2022-06-11 PROCEDURE — 80061 LIPID PANEL: CPT | Performed by: NURSE PRACTITIONER

## 2022-06-11 PROCEDURE — 3078F DIAST BP <80 MM HG: CPT | Performed by: NURSE PRACTITIONER

## 2022-06-11 PROCEDURE — 87624 HPV HI-RISK TYP POOLED RSLT: CPT | Performed by: NURSE PRACTITIONER

## 2022-06-11 PROCEDURE — 88175 CYTOPATH C/V AUTO FLUID REDO: CPT | Performed by: NURSE PRACTITIONER

## 2022-06-11 PROCEDURE — 83540 ASSAY OF IRON: CPT | Performed by: NURSE PRACTITIONER

## 2022-06-12 DIAGNOSIS — E55.9 VITAMIN D DEFICIENCY: Primary | ICD-10-CM

## 2022-06-12 RX ORDER — ERGOCALCIFEROL 1.25 MG/1
50000 CAPSULE ORAL WEEKLY
Qty: 12 CAPSULE | Refills: 0 | Status: SHIPPED | OUTPATIENT
Start: 2022-06-12 | End: 2022-08-29

## 2022-06-13 ENCOUNTER — TELEPHONE (OUTPATIENT)
Dept: FAMILY MEDICINE CLINIC | Facility: CLINIC | Age: 32
End: 2022-06-13

## 2022-06-13 DIAGNOSIS — D56.3 HETEROZYGOUS THALASSEMIA: ICD-10-CM

## 2022-06-13 DIAGNOSIS — D56.3 THALASSEMIA MINOR: Primary | ICD-10-CM

## 2022-06-13 DIAGNOSIS — D50.9 IRON DEFICIENCY ANEMIA, UNSPECIFIED IRON DEFICIENCY ANEMIA TYPE: ICD-10-CM

## 2022-06-13 LAB — HPV I/H RISK 1 DNA SPEC QL NAA+PROBE: NEGATIVE

## 2022-06-13 NOTE — TELEPHONE ENCOUNTER
----- Message from BREANA Tuttle sent at 6/12/2022  9:28 PM CDT -----  CBC abnormal. Referral to hematology. Please clarify who she wants to see. Cholesterol levels are great. Iron, thyroid, and vitamin B12 are stable. Vitamin D is very low. Recommend vitamin D 50,000 U weekly for 12 weeks  (prescription) and the 2000 U daily (over the counter). Recheck vitamin D in 6 months. Note to Christophert.

## 2022-06-16 NOTE — TELEPHONE ENCOUNTER
[facility] 6/16 with referral information    Pt viewed mychart results below on 6/12/22 0904

## 2022-06-16 NOTE — PATIENT INSTRUCTIONS
Establish with new hematologist.     Ayde Singh pending. Continue current medications. Follow up annually and as needed.

## 2022-06-16 NOTE — TELEPHONE ENCOUNTER
Patient viewed [facility] with hematology referral on 6/16/22 [id number]

## 2022-06-22 ENCOUNTER — TELEPHONE (OUTPATIENT)
Dept: FAMILY MEDICINE CLINIC | Facility: CLINIC | Age: 32
End: 2022-06-22

## 2022-06-22 NOTE — TELEPHONE ENCOUNTER
Left detailed message for pt in regards to pap results. Urged pt to call with any further questions or concerns.

## 2022-06-22 NOTE — TELEPHONE ENCOUNTER
----- Message from BREANA Lomax sent at 6/21/2022  9:59 PM CDT -----  Pap is normal. Repeat in 3 years and as needed Note to MyChart.

## 2022-07-13 NOTE — TELEPHONE ENCOUNTER
Future appt:    Last Appointment with provider:   6/11/2022 Physical. Return in 1 year. Last appointment at [facility]:  6/11/2022  Cholesterol, Total (mg/dL)   Date Value   06/11/2022 101     HDL Cholesterol (mg/dL)   Date Value   06/11/2022 44     LDL Cholesterol (mg/dL)   Date Value   06/11/2022 40     Triglycerides (mg/dL)   Date Value   06/11/2022 88     No results found for: EAG, A1C  Lab Results   Component Value Date    TSH 0.759 06/11/2022       No follow-ups on file.

## 2022-08-22 RX ORDER — ERGOCALCIFEROL 1.25 MG/1
CAPSULE ORAL
Qty: 12 CAPSULE | Refills: 0 | OUTPATIENT
Start: 2022-08-22

## 2022-08-22 NOTE — TELEPHONE ENCOUNTER
Future appt:    Last Appointment with provider:   6/11/2022 Physical  Last appointment at [facility]:  6/11/2022  Cholesterol, Total (mg/dL)   Date Value   06/11/2022 101     HDL Cholesterol (mg/dL)   Date Value   06/11/2022 44     LDL Cholesterol (mg/dL)   Date Value   06/11/2022 40     Triglycerides (mg/dL)   Date Value   06/11/2022 88     No results found for: EAG, A1C  Lab Results   Component Value Date    TSH 0.759 06/11/2022       No follow-ups on file.

## 2022-09-06 DIAGNOSIS — F42.9 OBSESSIVE-COMPULSIVE DISORDER, UNSPECIFIED TYPE: ICD-10-CM

## 2022-09-07 RX ORDER — DESVENLAFAXINE 50 MG/1
TABLET, EXTENDED RELEASE ORAL
Qty: 90 TABLET | Refills: 2 | Status: SHIPPED | OUTPATIENT
Start: 2022-09-07

## 2022-09-07 NOTE — TELEPHONE ENCOUNTER
Future appt:    Last Appointment with provider:   6/11/2022  Last appointment at [facility]:  6/11/2022(PX)-F/U 1yr    DESVENLAFAXINE ER 50 MG Oral Tablet 24 Hr    90tab  0refill        Filled:6/9/22 Summary: TAKE 1 TABLET(50 MG) BY MOUTH DAILY          Cholesterol, Total (mg/dL)   Date Value   06/11/2022 101     HDL Cholesterol (mg/dL)   Date Value   06/11/2022 44     LDL Cholesterol (mg/dL)   Date Value   06/11/2022 40     Triglycerides (mg/dL)   Date Value   06/11/2022 88     No results found for: EAG, A1C  Lab Results   Component Value Date    TSH 0.759 06/11/2022       No follow-ups on file.

## 2022-12-12 ENCOUNTER — TELEPHONE (OUTPATIENT)
Dept: FAMILY MEDICINE CLINIC | Facility: CLINIC | Age: 32
End: 2022-12-12

## 2022-12-13 NOTE — TELEPHONE ENCOUNTER
Left detailed message for patient. Prior [facility] has been approved. Call back with any concerns or questions.

## 2022-12-21 ENCOUNTER — TELEPHONE (OUTPATIENT)
Dept: FAMILY MEDICINE CLINIC | Facility: CLINIC | Age: 32
End: 2022-12-21

## 2022-12-21 NOTE — TELEPHONE ENCOUNTER
LM for patient to clarify. See telephone note 12/12/22- Prior [facility] was approved. Called to see if patient picked up medication/ any issues?

## 2022-12-21 NOTE — TELEPHONE ENCOUNTER
Please let patient know that we received a fax stating that her Pristiq is not going to be covered by her insurance. Please let me know what you are currently taking. Thank you.

## 2022-12-29 ENCOUNTER — TELEMEDICINE (OUTPATIENT)
Dept: FAMILY MEDICINE CLINIC | Facility: CLINIC | Age: 32
End: 2022-12-29
Payer: COMMERCIAL

## 2022-12-29 VITALS — BODY MASS INDEX: 22.22 KG/M2 | WEIGHT: 135 LBS | HEIGHT: 65.5 IN

## 2022-12-29 DIAGNOSIS — R68.89 FLU-LIKE SYMPTOMS: Primary | ICD-10-CM

## 2022-12-29 PROCEDURE — 99213 OFFICE O/P EST LOW 20 MIN: CPT | Performed by: NURSE PRACTITIONER

## 2022-12-29 RX ORDER — BENZONATATE 100 MG/1
100 CAPSULE ORAL 3 TIMES DAILY PRN
Qty: 15 CAPSULE | Refills: 0 | Status: SHIPPED | OUTPATIENT
Start: 2022-12-29

## 2022-12-29 NOTE — TELEPHONE ENCOUNTER
Pt called back. States she never did  script in fact she weaned herself off the pristiq and is no longer taking it. During that time she upped her sertraline to twice daily. Now she is only taking one sertraline a day and would like to wean off it as well. Would like recommendations on how to wean off the sertraline. Pt states she is feeling ok with a few irritable moments.

## 2022-12-29 NOTE — TELEPHONE ENCOUNTER
During the winter is not a time I recommend to wean off the medication. If wants to wean, recommend to wait until spring and then try taking a half a dose for a month. If doing well, then can decrease to every other day for 2 weeks. If doing well, then can stop the medicine. If during the weaning, has increase in symptoms needs to go back up to the previous dose. Please keep me updated.

## 2022-12-30 NOTE — PATIENT INSTRUCTIONS
Home covid as planned, send [employer] message update with results. ;    If negative consider flu/rsv testing through [address] [place]    Steam, humidifier, nasal saline, fluids, rest alternating with light activity as able  Tessalon prn cough; take with glass of water